# Patient Record
Sex: MALE | Race: OTHER | URBAN - METROPOLITAN AREA
[De-identification: names, ages, dates, MRNs, and addresses within clinical notes are randomized per-mention and may not be internally consistent; named-entity substitution may affect disease eponyms.]

---

## 2019-11-14 ENCOUNTER — HOSPITAL ENCOUNTER (OUTPATIENT)
Dept: LAB | Age: 39
Discharge: HOME | End: 2019-11-14

## 2019-11-14 ENCOUNTER — OFFICE VISIT (OUTPATIENT)
Dept: FAMILY MEDICINE CLINIC | Age: 39
End: 2019-11-14

## 2019-11-14 VITALS
WEIGHT: 141.8 LBS | SYSTOLIC BLOOD PRESSURE: 124 MMHG | OXYGEN SATURATION: 100 % | HEART RATE: 59 BPM | BODY MASS INDEX: 26.77 KG/M2 | HEIGHT: 61 IN | DIASTOLIC BLOOD PRESSURE: 63 MMHG | TEMPERATURE: 98 F

## 2019-11-14 DIAGNOSIS — Z55.0 ILLITERATE: ICD-10-CM

## 2019-11-14 DIAGNOSIS — R51.9 HEADACHE DISORDER: Primary | ICD-10-CM

## 2019-11-14 DIAGNOSIS — Z13.89 SCREENING FOR MULTIPLE CONDITIONS: ICD-10-CM

## 2019-11-14 DIAGNOSIS — Z23 ENCOUNTER FOR IMMUNIZATION: ICD-10-CM

## 2019-11-14 LAB
ALBUMIN SERPL-MCNC: 4.2 G/DL (ref 3.5–5)
ALBUMIN/GLOB SERPL: 1.1 {RATIO} (ref 1.1–2.2)
ALP SERPL-CCNC: 93 U/L (ref 45–117)
ALT SERPL-CCNC: 24 U/L (ref 12–78)
ANION GAP SERPL CALC-SCNC: 4 MMOL/L (ref 5–15)
AST SERPL-CCNC: 16 U/L (ref 15–37)
BILIRUB SERPL-MCNC: 0.5 MG/DL (ref 0.2–1)
BUN SERPL-MCNC: 10 MG/DL (ref 6–20)
BUN/CREAT SERPL: 15 (ref 12–20)
CALCIUM SERPL-MCNC: 9.5 MG/DL (ref 8.5–10.1)
CHLORIDE SERPL-SCNC: 104 MMOL/L (ref 97–108)
CO2 SERPL-SCNC: 31 MMOL/L (ref 21–32)
CREAT SERPL-MCNC: 0.68 MG/DL (ref 0.7–1.3)
EST. AVERAGE GLUCOSE BLD GHB EST-MCNC: 108 MG/DL
GLOBULIN SER CALC-MCNC: 3.8 G/DL (ref 2–4)
GLUCOSE SERPL-MCNC: 94 MG/DL (ref 65–100)
HBA1C MFR BLD: 5.4 % (ref 4–5.6)
POTASSIUM SERPL-SCNC: 4.1 MMOL/L (ref 3.5–5.1)
PROT SERPL-MCNC: 8 G/DL (ref 6.4–8.2)
SODIUM SERPL-SCNC: 139 MMOL/L (ref 136–145)

## 2019-11-14 PROCEDURE — 83036 HEMOGLOBIN GLYCOSYLATED A1C: CPT

## 2019-11-14 PROCEDURE — 80053 COMPREHEN METABOLIC PANEL: CPT

## 2019-11-14 RX ORDER — PROPRANOLOL HYDROCHLORIDE 10 MG/1
10 TABLET ORAL 2 TIMES DAILY
Qty: 60 TAB | Refills: 1 | Status: SHIPPED | OUTPATIENT
Start: 2019-11-14

## 2019-11-14 SDOH — EDUCATIONAL SECURITY - EDUCATION ATTAINMENT: ILITERACY AND LOW LEVEL LITERACY: Z55.0

## 2019-11-14 NOTE — PROGRESS NOTES
AVS printed and reviewed. E script discussed. Pt able to repeat back how to take medicine - 1 in am and 1 in pm.  Good Rx coupon available for [facility]. Instructed to schedule f/u appt prior to leaving clinic today. Discussion assisted by CAV , Jennifer Gandhi.

## 2019-11-14 NOTE — PROGRESS NOTES
[id number] [address]  Requests flu vaccine. Denies fever and egg allergy. VIS information sheet given to patient. Explained possible s/e. Reviewed s/sx indicating need to be seen in ER. Patient had no adverse reaction at time of discharge. Entered into VIIS. GIVEN BY Tracy Luis LPN.  Verdon Sever, RN

## 2019-11-18 NOTE — PROGRESS NOTES
Assessment/Plan:    Diagnoses and all orders for this visit:    1. Headache disorder  -     propranolol (INDERAL) 10 mg tablet; Take 1 Tab by mouth two (2) times a day. 2. Illiterate    3. Screening for multiple conditions  -     METABOLIC PANEL, COMPREHENSIVE; Future  -     HEMOGLOBIN A1C WITH EAG; Future    4. Encounter for immunization  -     INFLUENZA VIRUS VAC QUAD,SPLIT,PRESV FREE SYRINGE IM        Follow-up and Dispositions    · Return in about 2 months (around 1/14/2020). Layman Laura Cedeno PA-C  [id number] [facility] expressed understanding of this plan. An AVS was printed and given to the patient.      ----------------------------------------------------------------------    Chief Complaint   Patient presents with    Headache     with associated neck pain. facial swelling with headaches.  Immunization/Injection       History of Present Illness: This is a new pt whose primary language is an [place] [facility] dialect but he does speak [place] and this is how we communicate today. He presents with a long history of headaches. The headaches started more then 8 years ago, and maybe much longer. They have not changed in frequency, intensity or severity. He states that he has the headaches in the evenings and they wrap around his head. He takes OTC meds and the headaches go away. He feels that his face \"swells\" when he has the headaches. 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 He left his family in another country to work here in the [address] [place] [address],[address], due to necessity. He is struggling with the separation. He has a job working in [address]. It is a tough job he states but he can do it. The headaches do not stop him from being able to work    No past medical history on file. Current Outpatient Medications   Medication Sig Dispense Refill    propranolol (INDERAL) 10 mg tablet Take 1 Tab by mouth two (2) times a day.  60 Tab 1       No Known Allergies    Social History     Tobacco Use    Smoking status: Current Every Day Smoker     Types: Cigarettes    Smokeless tobacco: Never Used    Tobacco comment: 2 cigaretts daily   Substance Use Topics    Alcohol use: Never     Frequency: Never    Drug use: Never       No family history on file. Physical Exam:     Visit Vitals  /63 (BP 1 Location: Right arm, BP Patient Position: Sitting)   Pulse (!) 59   Temp 98 °F (36.7 °C) (Temporal)   Ht 5' 1.34\" (1.558 m)   Wt 141 lb 12.8 oz (64.3 kg)   SpO2 100%   BMI 26.50 kg/m²   pleasant, looks well    A&Ox3  WDWN NAD  Respirations normal and non labored  NEURO: CN 2-12 intact. PERRLA, EOMI.  Mild pterygium arabella not crossing over iris at this time   UE strength 5/5 arabella

## 2019-11-26 NOTE — PROGRESS NOTES
Letter mailed to pt with labs and message from provider. Also, pt asked to please call the office to speak with a nurse to discuss labs.  Maria E Fernandez RN

## 2019-12-27 ENCOUNTER — TELEPHONE (OUTPATIENT)
Dept: FAMILY MEDICINE CLINIC | Age: 39
End: 2019-12-27

## 2019-12-27 NOTE — TELEPHONE ENCOUNTER
I called back the pt and I was able to talk with his friend Tushar Kaufman. He confirmed his address as [address] [address] [address] [facility]. Also I told them that next time they come back to the [facility] [address] be sure to correct the address. I shared with him (he is a [facility] patient too) about the announcement of the new appointment process beginning, Monday, January 6, 2020.  Kelechi Coker, CMA

## 2019-12-27 NOTE — TELEPHONE ENCOUNTER
Tc with the pt. I couldn't gave the lab results from the Dr because pt stated he doesn't read and write and no other person was in the house at that moment. Pt confirm his name,  but couldn't confirm his address. I told the pt that his results were sent by mail and the address was wrong and we received the mail back.  Sandra Garcia, CMA

## 2023-05-21 RX ORDER — PROPRANOLOL HYDROCHLORIDE 10 MG/1
10 TABLET ORAL 2 TIMES DAILY
COMMUNITY
Start: 2019-11-14